# Patient Record
Sex: FEMALE | Race: WHITE | URBAN - METROPOLITAN AREA
[De-identification: names, ages, dates, MRNs, and addresses within clinical notes are randomized per-mention and may not be internally consistent; named-entity substitution may affect disease eponyms.]

---

## 2017-11-15 NOTE — PATIENT DISCUSSION
1.  1.  ARMD OU dry - Importance of smoking cessation blood pressure control and healthy diet were emphasized. In accordance with the AREDS study a good multivitamin containing EC and Zinc were recommened to be taken daily. Patient was instructed to self monitor their monocular vision (reading/Amsler Grid) at least weekly. Patient should immediately report any new onset of decreased vision or metamorphopsia. 2. Pseudophakia OU - IOLs stable. Monitor. 3. PCO  OS: (Posterior Capsule Opacification)  Not visually significant at this time. Monitor for yag capsulotomy necessity. 2. 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 The condition and possible etiologies were discussed with the patient.

## 2017-11-27 NOTE — PATIENT DISCUSSION
ARMD OU dry - Referral Dr. Severino Bowman to R/O any fluid OD. F/U here after to re-check prism for 4th nerve palsy.

## 2017-12-12 NOTE — PATIENT DISCUSSION
MOST LIKELY 2ND TO AMD - NO SIGN OF CNV - TO REEVAL IN 9 MONTHS OR EARLIER IF CHANGE IN [place].

## 2018-01-08 NOTE — PATIENT DISCUSSION
ARMD OU dry - Importance of smoking cessation blood pressure control and healthy diet were emphasized. In accordance with the AREDS study a good multivitamin containing EC and Zinc were recommened to be taken daily. Patient was instructed to self monitor their monocular vision (reading/Amsler Grid) at least weekly. Patient should immediately report any new onset of decreased vision or metamorphopsia.

## 2018-01-08 NOTE — PATIENT DISCUSSION
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 The condition and possible etiologies were discussed with the patient.

## 2020-06-10 NOTE — PATIENT DISCUSSION
1.  ARMD OU dry - Importance of smoking cessation blood pressure control and healthy diet were emphasized. In accordance with the AREDS study a good multivitamin containing EC and Zinc were recommened to be taken daily. Patient was instructed to self monitor their monocular vision (reading/Amsler Grid) at least weekly. Patient should immediately report any new onset of decreased vision or metamorphopsia. 2. Diplopia - rarely. Well compensated. 3. 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 The condition and possible etiologies were discussed with the patient. 4.   Neurotrophic Right side

## 2020-06-15 NOTE — PATIENT DISCUSSION
1.  Diplopia - Rx glasses with prism2. ARMD OU dry - Importance of smoking cessation blood pressure control and healthy diet were emphasized. In accordance with the AREDS study a good multivitamin containing EC and Zinc were recommened to be taken daily. Patient was instructed to self monitor their monocular vision (reading/Amsler Grid) at least weekly. Patient should immediately report any new onset of decreased vision or metamorphopsia. 3. 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 The condition and possible etiologies were discussed with the patient. 4. Refractive error5.   Right side parasthesia S/P CVA6 mos eval.

## 2020-08-27 NOTE — PATIENT DISCUSSION
1.  Diplopia - 2. 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 The condition and possible etiologies were discussed with the patient. Explained prism correct for primary gaze and need to use primary gaze. 6 mos CE

## 2020-09-01 ENCOUNTER — IMPORTED ENCOUNTER (OUTPATIENT)
Dept: URBAN - METROPOLITAN AREA CLINIC 9 | Facility: CLINIC | Age: 80
End: 2020-09-01

## 2021-10-16 ASSESSMENT — VISUAL ACUITY
OS_CC: 20/30 -2 SN
OS_CC: 20/25 SN
OD_CC: 20/25 SN
OD_CC: 20/30 -2 SN

## 2021-10-16 ASSESSMENT — KERATOMETRY
OS_K2POWER_DIOPTERS: 45
OS_K1POWER_DIOPTERS: 45
OS_AXISANGLE2_DEGREES: 90
OD_AXISANGLE2_DEGREES: 99
OD_K2POWER_DIOPTERS: 48.75
OS_AXISANGLE_DEGREES: 180
OD_K1POWER_DIOPTERS: 47
OD_AXISANGLE_DEGREES: 9

## 2021-10-16 ASSESSMENT — TONOMETRY
OS_IOP_MMHG: 16
OD_IOP_MMHG: 16